# Patient Record
Sex: FEMALE | Race: WHITE
[De-identification: names, ages, dates, MRNs, and addresses within clinical notes are randomized per-mention and may not be internally consistent; named-entity substitution may affect disease eponyms.]

---

## 2017-02-20 NOTE — REP
Clinical:  Anatomical evaluation.

 

Comparison: None .

 

Findings:

Examination demonstrates a single live intrauterine pregnancy in breech

presentation.  Fetal motion is identified by technologist.  Placenta is noted she

posterior and grade zero without evidence for placenta previa or abruption.

Amniotic fluid volume is normal.  Cervix measures 4.7 cm in length and appears

closed.  No evidence for nuchal cord.

 

Gestational age by LMP 19 weeks 5 days with [facility] 07/12/2017 .

Gestational age by current measurements 19 weeks 3 days with [facility] 07/14/2017 .

 

FHR equals 141 beats per minute.

BPD  4.4 cm     19 weeks 2 days

HC  16.8 cm     19 weeks 3 days

AC  13.6 cm     19 weeks 0 days

FL  3.1 cm      19 weeks 4 day

HL  3.1 cm      20 weeks 1 day

HC/AC ratio  1.24

 

Estimated fetal weight 285 grams ( 33rd percentile).

 

Anatomical assessment demonstrates normal structures including cranium, choroid

plexus, cavum, cerebellum/posterior fossa, facial features, lungs,  diaphragm,

stomach, cord insertion/three-vessel cord, kidneys/bladder, spine, and

extremities.

Limited evaluation of the facial profile and heart/cardiac ventricular outflow

tracts noted.

 

Impression:

1.  Single live intrauterine pregnancy in breech presentation demonstrating

appropriate interval growth.

2.  Cervix measures 4.7 cm length and appears closed.

3.  Limited facial profile and heart/ventricular outflow tracts may warrant

reevaluation and follow-up.  Remainder of the anatomical assessment is complete

and normal.

 

 

Signed by

Tawanda Riley MD 02/20/2017 04:06 P

## 2017-03-22 NOTE — REP
Clinical:  Anatomical evaluation.

 

Comparison: 02/20/2017 .

 

Findings:

Examination demonstrates a single live intrauterine pregnancy in breech

presentation.  Fetal motion is identified by technologist.  Placenta is noted

posteriorly and grade zero without evidence for placenta previa or abruption.

Amniotic fluid volume is normal.  Cervix measures 4.5 cm in length and appears

closed.  No evidence for nuchal cord.

 

Gestational age by LMP 23 weeks 5 days with [facility] 07/14/2017 .

Gestational age by current measurements 23 weeks 1 day with [facility] 07/18/2017 .

 

FHR equals 141 beats per minute.

Estimated fetal weight 532 grams ( 18th percentile).

 

Anatomical assessment demonstrates normal structures including cranium, choroid

plexus, cavum, cerebellum/posterior fossa, facial features, lungs, four-chamber

heart/ventricular outflow tracts, diaphragm, stomach, cord insertion/three-vessel

cord, bladder, spine, and extremities.

 

Impression:

Single live intrauterine pregnancy in breech presentation demonstrating

appropriate interval growth.  In conjunction with prior examination anatomical

assessment is complete and normal.

 

 

Signed by

Tawanda Riley MD 03/22/2017 11:32 A

## 2017-03-27 NOTE — REPUSA
CLINICAL HISTORY: Cervical length.

TECHNIQUE: Realtime sonographic images were obtained in multiple projections via TA approach. The exa
mination was performed by the ultrasonographer and still images were submitted for interpretation.

COMMENTS:

Single, live intrauterine gestation in vertex presentation.

Maternal cervical length is 4.61 cm. This was measured on transvaginal images.

Findings of the internal cervical os was not visualized.

No vaso previa is noted.

Fetal heart rate 132 beats per minute.

Posterior placenta.

Placenta previa is not identified.

Amniotic fluid volume appears normal.

Amniotic fluid index is 11.9 cm.

Nuchal CORD was not seen.

IMPRESSION:

Cervical length as above.

Thank you for your kind referral of this patient.

     Electronically signed by JAN ERNANDEZ MD on 03/27/2017 02:51:25 AM ET

## 2017-07-10 ENCOUNTER — HOSPITAL ENCOUNTER (INPATIENT)
Dept: HOSPITAL 53 - M LDI | Age: 32
LOS: 2 days | Discharge: HOME | DRG: 540 | End: 2017-07-12
Attending: OBSTETRICS & GYNECOLOGY | Admitting: OBSTETRICS & GYNECOLOGY
Payer: COMMERCIAL

## 2017-07-10 VITALS — SYSTOLIC BLOOD PRESSURE: 98 MMHG | DIASTOLIC BLOOD PRESSURE: 49 MMHG

## 2017-07-10 VITALS — DIASTOLIC BLOOD PRESSURE: 56 MMHG | SYSTOLIC BLOOD PRESSURE: 109 MMHG

## 2017-07-10 VITALS — DIASTOLIC BLOOD PRESSURE: 53 MMHG | SYSTOLIC BLOOD PRESSURE: 107 MMHG

## 2017-07-10 VITALS — DIASTOLIC BLOOD PRESSURE: 66 MMHG | SYSTOLIC BLOOD PRESSURE: 129 MMHG

## 2017-07-10 VITALS — SYSTOLIC BLOOD PRESSURE: 100 MMHG | DIASTOLIC BLOOD PRESSURE: 51 MMHG

## 2017-07-10 VITALS — DIASTOLIC BLOOD PRESSURE: 59 MMHG | SYSTOLIC BLOOD PRESSURE: 114 MMHG

## 2017-07-10 VITALS — SYSTOLIC BLOOD PRESSURE: 106 MMHG | DIASTOLIC BLOOD PRESSURE: 58 MMHG

## 2017-07-10 VITALS — DIASTOLIC BLOOD PRESSURE: 56 MMHG | SYSTOLIC BLOOD PRESSURE: 122 MMHG

## 2017-07-10 VITALS — HEIGHT: 61 IN | BODY MASS INDEX: 33.79 KG/M2 | WEIGHT: 179 LBS

## 2017-07-10 VITALS — SYSTOLIC BLOOD PRESSURE: 106 MMHG | DIASTOLIC BLOOD PRESSURE: 57 MMHG

## 2017-07-10 DIAGNOSIS — O34.211: Primary | ICD-10-CM

## 2017-07-10 DIAGNOSIS — Z30.2: ICD-10-CM

## 2017-07-10 DIAGNOSIS — Z3A.39: ICD-10-CM

## 2017-07-10 LAB
ERYTHROCYTE [DISTWIDTH] IN BLOOD BY AUTOMATED COUNT: 16.3 % (ref 11.5–14.5)
MCH RBC QN AUTO: 23.9 PG (ref 27–33)
MCHC RBC AUTO-ENTMCNC: 31.8 G/DL (ref 32–36.5)
MCV RBC AUTO: 75.1 FL (ref 80–96)
PLATELET # BLD AUTO: 155 K/MM3 (ref 150–450)
WBC # BLD AUTO: 8.5 K/MM3 (ref 4–10)

## 2017-07-10 PROCEDURE — 0UB70ZZ EXCISION OF BILATERAL FALLOPIAN TUBES, OPEN APPROACH: ICD-10-PCS | Performed by: OBSTETRICS & GYNECOLOGY

## 2017-07-10 RX ADMIN — DOCUSATE SODIUM SCH MG: 100 CAPSULE, LIQUID FILLED ORAL at 11:45

## 2017-07-10 RX ADMIN — SODIUM CHLORIDE, POTASSIUM CHLORIDE, SODIUM LACTATE AND CALCIUM CHLORIDE SCH MLS/HR: 600; 310; 30; 20 INJECTION, SOLUTION INTRAVENOUS at 18:00

## 2017-07-10 RX ADMIN — Medication SCH MLS/HR: at 14:00

## 2017-07-10 RX ADMIN — Medication SCH MLS/HR: at 09:30

## 2017-07-10 RX ADMIN — DOCUSATE SODIUM SCH MG: 100 CAPSULE, LIQUID FILLED ORAL at 20:42

## 2017-07-10 RX ADMIN — KETOROLAC TROMETHAMINE SCH MG: 30 INJECTION, SOLUTION INTRAMUSCULAR at 20:42

## 2017-07-10 RX ADMIN — Medication SCH TAB: at 11:45

## 2017-07-10 RX ADMIN — SODIUM CHLORIDE, POTASSIUM CHLORIDE, SODIUM LACTATE AND CALCIUM CHLORIDE SCH MLS/HR: 600; 310; 30; 20 INJECTION, SOLUTION INTRAVENOUS at 14:26

## 2017-07-10 RX ADMIN — KETOROLAC TROMETHAMINE SCH MG: 30 INJECTION, SOLUTION INTRAMUSCULAR at 15:19

## 2017-07-10 RX ADMIN — Medication SCH MLS/HR: at 18:00

## 2017-07-10 RX ADMIN — SODIUM CHLORIDE, POTASSIUM CHLORIDE, SODIUM LACTATE AND CALCIUM CHLORIDE SCH MLS/HR: 600; 310; 30; 20 INJECTION, SOLUTION INTRAVENOUS at 20:40

## 2017-07-11 VITALS — DIASTOLIC BLOOD PRESSURE: 68 MMHG | SYSTOLIC BLOOD PRESSURE: 110 MMHG

## 2017-07-11 VITALS — DIASTOLIC BLOOD PRESSURE: 59 MMHG | SYSTOLIC BLOOD PRESSURE: 113 MMHG

## 2017-07-11 VITALS — DIASTOLIC BLOOD PRESSURE: 69 MMHG | SYSTOLIC BLOOD PRESSURE: 127 MMHG

## 2017-07-11 VITALS — SYSTOLIC BLOOD PRESSURE: 89 MMHG | DIASTOLIC BLOOD PRESSURE: 49 MMHG

## 2017-07-11 VITALS — DIASTOLIC BLOOD PRESSURE: 56 MMHG | SYSTOLIC BLOOD PRESSURE: 110 MMHG

## 2017-07-11 LAB
ERYTHROCYTE [DISTWIDTH] IN BLOOD BY AUTOMATED COUNT: 16.5 % (ref 11.5–14.5)
MCH RBC QN AUTO: 23.9 PG (ref 27–33)
MCHC RBC AUTO-ENTMCNC: 31.2 G/DL (ref 32–36.5)
MCV RBC AUTO: 76.7 FL (ref 80–96)
PLATELET # BLD AUTO: 172 K/MM3 (ref 150–450)
WBC # BLD AUTO: 8.6 K/MM3 (ref 4–10)

## 2017-07-11 RX ADMIN — KETOROLAC TROMETHAMINE SCH MG: 30 INJECTION, SOLUTION INTRAMUSCULAR at 03:14

## 2017-07-11 RX ADMIN — DOCUSATE SODIUM SCH MG: 100 CAPSULE, LIQUID FILLED ORAL at 09:18

## 2017-07-11 RX ADMIN — Medication SCH TAB: at 09:18

## 2017-07-11 RX ADMIN — KETOROLAC TROMETHAMINE SCH MG: 30 INJECTION, SOLUTION INTRAMUSCULAR at 09:18

## 2017-07-11 RX ADMIN — DOCUSATE SODIUM SCH MG: 100 CAPSULE, LIQUID FILLED ORAL at 21:46

## 2017-07-11 RX ADMIN — IBUPROFEN SCH MG: 800 TABLET, FILM COATED ORAL at 16:12

## 2017-07-12 VITALS — SYSTOLIC BLOOD PRESSURE: 106 MMHG | DIASTOLIC BLOOD PRESSURE: 54 MMHG

## 2017-07-12 RX ADMIN — IBUPROFEN SCH MG: 800 TABLET, FILM COATED ORAL at 09:20

## 2017-07-12 RX ADMIN — Medication SCH TAB: at 09:00

## 2017-07-12 RX ADMIN — DOCUSATE SODIUM SCH MG: 100 CAPSULE, LIQUID FILLED ORAL at 09:19

## 2017-07-12 RX ADMIN — IBUPROFEN SCH MG: 800 TABLET, FILM COATED ORAL at 00:49

## 2018-02-12 ENCOUNTER — HOSPITAL ENCOUNTER (OUTPATIENT)
Dept: HOSPITAL 53 - M LAB REF | Age: 33
End: 2018-02-12
Attending: OBSTETRICS & GYNECOLOGY
Payer: COMMERCIAL

## 2018-02-12 DIAGNOSIS — N92.0: Primary | ICD-10-CM

## 2018-02-12 LAB
HEMATOCRIT: 19.7 % (ref 36–47)
HEMOGLOBIN: 5.6 G/DL (ref 12–16)
MEAN CORPUSCULAR HEMOGLOBIN: 20.6 PG (ref 27–33)
MEAN CORPUSCULAR HGB CONC: 28.4 G/DL (ref 32–36.5)
MEAN CORPUSCULAR VOLUME: 72.4 FL (ref 80–96)
NRBC BLD AUTO-RTO: 0 % (ref 0–0)
PLATELET COUNT, AUTOMATED: 225 10^3/UL (ref 150–450)
RED BLOOD COUNT: 2.72 10^6/UL (ref 4–5.4)
RED CELL DISTRIBUTION WIDTH: 15 % (ref 11.5–14.5)
THYROID STIMULATING HORMONE: 1.15 UIU/ML (ref 0.36–3.74)
WHITE BLOOD COUNT: 5.7 10^3/UL (ref 4–10)

## 2018-02-13 ENCOUNTER — HOSPITAL ENCOUNTER (OUTPATIENT)
Dept: HOSPITAL 53 - M INFU | Age: 33
Setting detail: OBSERVATION
LOS: 1 days | Discharge: HOME | End: 2018-02-14
Attending: OBSTETRICS & GYNECOLOGY | Admitting: OBSTETRICS & GYNECOLOGY
Payer: COMMERCIAL

## 2018-02-13 DIAGNOSIS — Z88.5: ICD-10-CM

## 2018-02-13 DIAGNOSIS — D50.0: Primary | ICD-10-CM

## 2018-02-13 DIAGNOSIS — N93.9: ICD-10-CM

## 2018-02-13 LAB
ALBUMIN/GLOBULIN RATIO: 1.37 (ref 1–1.93)
ALBUMIN: 4.1 GM/DL (ref 3.2–5.2)
ALKALINE PHOSPHATASE: 51 U/L (ref 45–117)
ALT SERPL W P-5'-P-CCNC: 17 U/L (ref 12–78)
ANION GAP: 8 MEQ/L (ref 8–16)
AST SERPL-CCNC: 12 U/L (ref 7–37)
BILIRUBIN,TOTAL: 0.7 MG/DL (ref 0.2–1)
BLOOD UREA NITROGEN: 18 MG/DL (ref 7–18)
CALCIUM LEVEL: 8.6 MG/DL (ref 8.5–10.1)
CARBON DIOXIDE LEVEL: 27 MEQ/L (ref 21–32)
CHLORIDE LEVEL: 106 MEQ/L (ref 98–107)
CREATININE FOR GFR: 0.77 MG/DL (ref 0.55–1.3)
FIBRINOGEN: 325 MG/DL (ref 221–452)
GFR SERPL CREATININE-BSD FRML MDRD: > 60 ML/MIN/{1.73_M2} (ref 60–?)
GLUCOSE, FASTING: 97 MG/DL (ref 70–100)
HEMATOCRIT: 20.4 % (ref 36–47)
HEMOGLOBIN: 5.8 G/DL (ref 12–16)
INR: 0.94
MEAN CORPUSCULAR HEMOGLOBIN: 20.4 PG (ref 27–33)
MEAN CORPUSCULAR HGB CONC: 28.4 G/DL (ref 32–36.5)
MEAN CORPUSCULAR VOLUME: 71.6 FL (ref 80–96)
NRBC BLD AUTO-RTO: 0 % (ref 0–0)
PARTIAL THROMBOPLASTIN TIME: 26.7 SECONDS (ref 26.8–37.9)
PLATELET COUNT, AUTOMATED: 222 10^3/UL (ref 150–450)
POTASSIUM SERUM: 3.5 MEQ/L (ref 3.5–5.1)
PROTHROMBIN TIME: 12.6 SECONDS (ref 12.4–14.5)
RED BLOOD COUNT: 2.85 10^6/UL (ref 4–5.4)
RED CELL DISTRIBUTION WIDTH: 15.1 % (ref 11.5–14.5)
SODIUM LEVEL: 141 MEQ/L (ref 136–145)
TOTAL PROTEIN: 7.1 GM/DL (ref 6.4–8.2)
WHITE BLOOD COUNT: 6.5 10^3/UL (ref 4–10)

## 2018-02-13 PROCEDURE — 36430 TRANSFUSION BLD/BLD COMPNT: CPT

## 2018-02-14 LAB
HEMATOCRIT: 28.5 % (ref 36–47)
HEMOGLOBIN: 9.1 G/DL (ref 12–16)
HPV LOW VOL RFLX: (no result)
IMMEDIATE SPIN CROSSMATCH: 1 3
MEAN CORPUSCULAR HEMOGLOBIN: 23.8 PG (ref 27–33)
MEAN CORPUSCULAR HGB CONC: 31.9 G/DL (ref 32–36.5)
MEAN CORPUSCULAR VOLUME: 74.4 FL (ref 80–96)
NRBC BLD AUTO-RTO: 0 % (ref 0–0)
PLATELET COUNT, AUTOMATED: 198 10^3/UL (ref 150–450)
RED BLOOD COUNT: 3.83 10^6/UL (ref 4–5.4)
RED CELL DISTRIBUTION WIDTH: 16.4 % (ref 11.5–14.5)
WHITE BLOOD COUNT: 5.7 10^3/UL (ref 4–10)

## 2018-02-14 RX ADMIN — ACETAMINOPHEN 1 MG: 500 TABLET ORAL at 01:33

## 2021-12-07 ENCOUNTER — HOSPITAL ENCOUNTER (OUTPATIENT)
Dept: HOSPITAL 53 - M OUTALCOH | Age: 36
End: 2021-12-07
Attending: PSYCHIATRY & NEUROLOGY
Payer: MEDICAID

## 2021-12-07 DIAGNOSIS — Z03.89: Primary | ICD-10-CM

## 2021-12-28 ENCOUNTER — HOSPITAL ENCOUNTER (OUTPATIENT)
Dept: HOSPITAL 53 - M OUTALCOH | Age: 36
LOS: 3 days | End: 2021-12-31
Attending: PSYCHIATRY & NEUROLOGY
Payer: MEDICAID

## 2021-12-28 DIAGNOSIS — Z03.89: Primary | ICD-10-CM

## 2022-01-03 ENCOUNTER — HOSPITAL ENCOUNTER (OUTPATIENT)
Dept: HOSPITAL 53 - M WUC | Age: 37
End: 2022-01-03
Payer: MEDICAID
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
## 2022-01-03 DIAGNOSIS — S93.401A: Primary | ICD-10-CM

## 2022-01-03 NOTE — REP
INDICATION:

SPRAIN



COMPARISON:

None.



TECHNIQUE:

Four views right ankle.



FINDINGS:

There is no evidence of acute fracture, dislocation, or intrinsic bone disease.The

ankle mortise is anatomic.



IMPRESSION:

No fracture or dislocation.





<Electronically signed by Danny Spring > 01/03/22 1021